# Patient Record
Sex: MALE | Race: WHITE | NOT HISPANIC OR LATINO | Employment: FULL TIME | URBAN - METROPOLITAN AREA
[De-identification: names, ages, dates, MRNs, and addresses within clinical notes are randomized per-mention and may not be internally consistent; named-entity substitution may affect disease eponyms.]

---

## 2023-10-02 ENCOUNTER — OFFICE VISIT (OUTPATIENT)
Dept: URGENT CARE | Facility: CLINIC | Age: 48
End: 2023-10-02
Payer: OTHER GOVERNMENT

## 2023-10-02 VITALS
HEART RATE: 66 BPM | SYSTOLIC BLOOD PRESSURE: 117 MMHG | BODY MASS INDEX: 23.34 KG/M2 | WEIGHT: 154 LBS | TEMPERATURE: 99 F | DIASTOLIC BLOOD PRESSURE: 72 MMHG | HEIGHT: 68 IN | OXYGEN SATURATION: 95 % | RESPIRATION RATE: 16 BRPM

## 2023-10-02 DIAGNOSIS — R09.82 POST-NASAL DRIP: ICD-10-CM

## 2023-10-02 DIAGNOSIS — R09.81 SINUS CONGESTION: Primary | ICD-10-CM

## 2023-10-02 PROCEDURE — 99213 OFFICE O/P EST LOW 20 MIN: CPT | Mod: 25,S$GLB,, | Performed by: NURSE PRACTITIONER

## 2023-10-02 PROCEDURE — 99213 PR OFFICE/OUTPT VISIT, EST, LEVL III, 20-29 MIN: ICD-10-PCS | Mod: 25,S$GLB,, | Performed by: NURSE PRACTITIONER

## 2023-10-02 PROCEDURE — 96372 THER/PROPH/DIAG INJ SC/IM: CPT | Mod: S$GLB,,, | Performed by: FAMILY MEDICINE

## 2023-10-02 PROCEDURE — 96372 PR INJECTION,THERAP/PROPH/DIAG2ST, IM OR SUBCUT: ICD-10-PCS | Mod: S$GLB,,, | Performed by: FAMILY MEDICINE

## 2023-10-02 RX ORDER — DEXAMETHASONE SODIUM PHOSPHATE 4 MG/ML
4 INJECTION, SOLUTION INTRA-ARTICULAR; INTRALESIONAL; INTRAMUSCULAR; INTRAVENOUS; SOFT TISSUE
Status: COMPLETED | OUTPATIENT
Start: 2023-10-02 | End: 2023-10-02

## 2023-10-02 RX ORDER — TIZANIDINE 2 MG/1
TABLET ORAL
COMMUNITY
Start: 2023-09-11 | End: 2024-01-10

## 2023-10-02 RX ORDER — ROSUVASTATIN CALCIUM 5 MG/1
5 TABLET, COATED ORAL NIGHTLY
COMMUNITY
Start: 2023-05-30 | End: 2024-01-10 | Stop reason: SDUPTHER

## 2023-10-02 RX ORDER — VALACYCLOVIR HYDROCHLORIDE 500 MG/1
500 TABLET, FILM COATED ORAL
COMMUNITY
Start: 2023-06-17 | End: 2023-12-14

## 2023-10-02 RX ORDER — LISINOPRIL 10 MG/1
10 TABLET ORAL
COMMUNITY
Start: 2023-07-13

## 2023-10-02 RX ADMIN — DEXAMETHASONE SODIUM PHOSPHATE 4 MG: 4 INJECTION, SOLUTION INTRA-ARTICULAR; INTRALESIONAL; INTRAMUSCULAR; INTRAVENOUS; SOFT TISSUE at 10:10

## 2023-10-02 NOTE — PROGRESS NOTES
"Subjective:      Patient ID: Eddie Brooks is a 48 y.o. male.    Vitals:  height is 5' 8" (1.727 m) and weight is 69.9 kg (154 lb). His oral temperature is 98.5 °F (36.9 °C). His blood pressure is 117/72 and his pulse is 66. His respiration is 16 and oxygen saturation is 95%.     Chief Complaint: Sore Throat    Sore throat, dry cough and fatigue that began yesterday.  Patient declines testing.  Patient request steroid injection.    Sore Throat   This is a new problem. The current episode started yesterday. Associated symptoms include coughing. Associated symptoms comments: fatigue.       HENT:  Positive for sore throat.    Respiratory:  Positive for cough.       Objective:     Physical Exam   HENT:   Head: Normocephalic and atraumatic.   Ears:   Right Ear: Tympanic membrane normal.   Left Ear: Tympanic membrane normal.   Nose: Nose normal.   Mouth/Throat: Mucous membranes are moist. Oropharynx is clear.      Comments: Post nasal drip noted  Neck: Neck supple.   Cardiovascular: Normal rate, regular rhythm, normal heart sounds and normal pulses.   Pulmonary/Chest: Effort normal and breath sounds normal.   Abdominal: Normal appearance.   Neurological: He is alert.     Assessment:     1. Sinus congestion    2. Post-nasal drip        Plan:       Sinus congestion    Post-nasal drip    Other orders  -     dexAMETHasone injection 4 mg                  Sinus congestion postnasal drip.  Postnasal drip leading to sore throat symptoms.  No evidence of strep.  Patient does not have any systemic symptoms at this time if those present he needs to do the COVID flu testing.  A discussion was made going over the risks and benefits of the steroid use and after this discussion they decided to proceed with injection. They understood the choice, were able to express a  choice, appreciated the risks associated with it, and they had logical reasoning for their choice and demonstrated capacity.     "

## 2023-12-21 ENCOUNTER — OFFICE VISIT (OUTPATIENT)
Dept: URGENT CARE | Facility: CLINIC | Age: 48
End: 2023-12-21
Payer: OTHER GOVERNMENT

## 2023-12-21 VITALS
OXYGEN SATURATION: 96 % | HEIGHT: 68 IN | TEMPERATURE: 102 F | WEIGHT: 160 LBS | DIASTOLIC BLOOD PRESSURE: 76 MMHG | BODY MASS INDEX: 24.25 KG/M2 | SYSTOLIC BLOOD PRESSURE: 129 MMHG | RESPIRATION RATE: 16 BRPM | HEART RATE: 107 BPM

## 2023-12-21 DIAGNOSIS — Z20.822 COVID-19 VIRUS NOT DETECTED: ICD-10-CM

## 2023-12-21 DIAGNOSIS — R50.9 FEVER, UNSPECIFIED FEVER CAUSE: ICD-10-CM

## 2023-12-21 DIAGNOSIS — J11.1 FLU: Primary | ICD-10-CM

## 2023-12-21 LAB
CTP QC/QA: YES
FLUAV AG NPH QL: NEGATIVE
FLUBV AG NPH QL: NEGATIVE
S PYO RRNA THROAT QL PROBE: NEGATIVE
SARS-COV-2 AG RESP QL IA.RAPID: NEGATIVE

## 2023-12-21 PROCEDURE — 87804 POCT INFLUENZA A/B: ICD-10-PCS | Mod: QW,,,

## 2023-12-21 PROCEDURE — 87811 SARS CORONAVIRUS 2 ANTIGEN POCT, MANUAL READ: ICD-10-PCS | Mod: QW,S$GLB,,

## 2023-12-21 PROCEDURE — 87880 POCT RAPID STREP A: ICD-10-PCS | Mod: QW,,,

## 2023-12-21 PROCEDURE — 99214 PR OFFICE/OUTPT VISIT, EST, LEVL IV, 30-39 MIN: ICD-10-PCS | Mod: S$GLB,,,

## 2023-12-21 PROCEDURE — 87804 INFLUENZA ASSAY W/OPTIC: CPT | Mod: QW,,,

## 2023-12-21 PROCEDURE — 87811 SARS-COV-2 COVID19 W/OPTIC: CPT | Mod: QW,S$GLB,,

## 2023-12-21 PROCEDURE — 87880 STREP A ASSAY W/OPTIC: CPT | Mod: QW,,,

## 2023-12-21 PROCEDURE — 99214 OFFICE O/P EST MOD 30 MIN: CPT | Mod: S$GLB,,,

## 2023-12-21 RX ORDER — ALBUTEROL SULFATE 90 UG/1
2 AEROSOL, METERED RESPIRATORY (INHALATION) EVERY 6 HOURS PRN
Qty: 6.7 G | Refills: 0 | Status: SHIPPED | OUTPATIENT
Start: 2023-12-21 | End: 2024-01-10

## 2023-12-21 RX ORDER — IBUPROFEN 600 MG/1
600 TABLET ORAL
Status: COMPLETED | OUTPATIENT
Start: 2023-12-21 | End: 2023-12-21

## 2023-12-21 RX ORDER — DEXTROMETHORPHAN HYDROBROMIDE AND GUAIFENESIN 10; 200 MG/1; MG/1
1 CAPSULE, GELATIN COATED ORAL EVERY 4 HOURS PRN
Qty: 30 EACH | Refills: 0 | Status: SHIPPED | OUTPATIENT
Start: 2023-12-21 | End: 2023-12-26

## 2023-12-21 RX ORDER — ONDANSETRON 4 MG/1
4 TABLET, ORALLY DISINTEGRATING ORAL EVERY 6 HOURS PRN
Qty: 20 TABLET | Refills: 0 | Status: SHIPPED | OUTPATIENT
Start: 2023-12-21 | End: 2024-01-10

## 2023-12-21 RX ORDER — OSELTAMIVIR PHOSPHATE 75 MG/1
75 CAPSULE ORAL 2 TIMES DAILY
Qty: 10 CAPSULE | Refills: 0 | Status: SHIPPED | OUTPATIENT
Start: 2023-12-21 | End: 2023-12-26

## 2023-12-21 RX ORDER — BENZONATATE 100 MG/1
100 CAPSULE ORAL 3 TIMES DAILY PRN
Qty: 30 CAPSULE | Refills: 0 | Status: SHIPPED | OUTPATIENT
Start: 2023-12-21 | End: 2023-12-31

## 2023-12-21 RX ADMIN — IBUPROFEN 600 MG: 600 TABLET ORAL at 10:12

## 2023-12-21 NOTE — PROGRESS NOTES
"Subjective:      Patient ID: Eddie Brooks is a 48 y.o. male.    Vitals:  height is 5' 8" (1.727 m) and weight is 72.6 kg (160 lb). His temperature is 102.1 °F (38.9 °C) (abnormal). His blood pressure is 129/76 and his pulse is 107. His respiration is 16 and oxygen saturation is 96%.     Chief Complaint: Fever    Mr. Morgan, past medical history of hypertension, presents to clinic with a chief complaint 102 fever T-max, headache, congestion, chest pressure, productive cough, and body aches that began this morning.  He is also reporting sore throat that he has had for several months.  The morning when he gets up brushes his teeth he will spit up scant amount of hemoptysis.  Is not persistent.  Patient does endorse regular alcohol use.  No history of varices.  He also states he feels like he has a weight on his chest and dyspnea.  States will not get a full breath due to pain on periphery.  No history of coronary artery disease, or diabetes.  He reports his high blood pressure is controlled.  No family history of early heart disease.  Pain is not reproducible with palpation, or movement.  Patient was referred to the emergency room for further evaluation of chest discomfort.    Spouse at home influenza positive as well, test negative in clinic. Although, febrile with recent exposure suspect influenza, and false-negative test.    Fever   This is a new problem. The current episode started today. The maximum temperature noted was 102 to 102.9 F. Associated symptoms include chest pain, congestion, coughing, headaches, muscle aches, nausea and a sore throat. Pertinent negatives include no rash. He has tried nothing for the symptoms.   Risk factors: sick contacts        Constitution: Positive for fever.   HENT:  Positive for congestion and sore throat.    Neck: Negative for neck pain.   Cardiovascular:  Positive for chest pain.   Eyes: Negative.    Respiratory:  Positive for chest tightness, cough and shortness of " breath.    Gastrointestinal:  Positive for nausea.   Genitourinary: Negative.    Musculoskeletal:  Positive for muscle ache.   Skin:  Negative for rash.   Allergic/Immunologic: Negative.  Positive for immunizations up-to-date. Negative for flu shot.   Neurological:  Positive for headaches.   Hematologic/Lymphatic: Negative.    Psychiatric/Behavioral: Negative.        Objective:     Physical Exam   Constitutional: He is oriented to person, place, and time. He appears well-developed. He is cooperative.  Non-toxic appearance. He does not appear ill. No distress. normal  HENT:   Head: Normocephalic and atraumatic.   Ears:   Right Ear: Hearing and external ear normal. impacted cerumen  Left Ear: Hearing and external ear normal. impacted cerumen  Nose: Nose normal. No mucosal edema or nasal deformity. No epistaxis. Right sinus exhibits no maxillary sinus tenderness and no frontal sinus tenderness. Left sinus exhibits no maxillary sinus tenderness and no frontal sinus tenderness.   Mouth/Throat: Uvula is midline and mucous membranes are normal. Mucous membranes are moist. No trismus in the jaw. Normal dentition. No uvula swelling. Posterior oropharyngeal erythema present. No oropharyngeal exudate. Oropharynx is clear.   Eyes: Conjunctivae and lids are normal. Pupils are equal, round, and reactive to light. Extraocular movement intact   Neck: Trachea normal and phonation normal. Neck supple.   Cardiovascular: Normal rate, regular rhythm, normal heart sounds and normal pulses.   Pulmonary/Chest: Effort normal and breath sounds normal. He exhibits no tenderness.   Abdominal: Normal appearance. Soft. flat abdomen   Musculoskeletal: Normal range of motion.         General: Normal range of motion.   Lymphadenopathy:     He has no cervical adenopathy.   Neurological: no focal deficit. He is alert, oriented to person, place, and time and at baseline. He exhibits normal muscle tone.   Skin: Skin is warm, dry, intact and not  diaphoretic. Capillary refill takes 2 to 3 seconds.   Psychiatric: His speech is normal and behavior is normal. Mood, judgment and thought content normal.   Nursing note and vitals reviewed.      Assessment:     1. Flu    2. Fever, unspecified fever cause    3. COVID-19 virus not detected        Plan:       Flu  -     oseltamivir (TAMIFLU) 75 MG capsule; Take 1 capsule (75 mg total) by mouth 2 (two) times daily. for 5 days  Dispense: 10 capsule; Refill: 0  -     albuterol (PROVENTIL HFA) 90 mcg/actuation inhaler; Inhale 2 puffs into the lungs every 6 (six) hours as needed for Wheezing. Rescue  Dispense: 6.7 g; Refill: 0  -     benzonatate (TESSALON) 100 MG capsule; Take 1 capsule (100 mg total) by mouth 3 (three) times daily as needed for Cough.  Dispense: 30 capsule; Refill: 0  -     dextromethorphan-guaiFENesin (CORICIDIN HBP CHEST YUE-COUGH)  mg Cap; Take 1 tablet by mouth every 4 (four) hours as needed (Cough and congestion).  Dispense: 30 each; Refill: 0  -     ondansetron (ZOFRAN-ODT) 4 MG TbDL; Take 1 tablet (4 mg total) by mouth every 6 (six) hours as needed (nausea).  Dispense: 20 tablet; Refill: 0    Fever, unspecified fever cause  -     SARS Coronavirus 2 Antigen, POCT Manual Read  -     POCT Influenza A/B Rapid Antigen  -     POCT rapid strep A  -     ibuprofen tablet 600 mg    COVID-19 virus not detected      Emergency room for further evaluation of dyspnea and chest pain

## 2023-12-21 NOTE — PATIENT INSTRUCTIONS
Begin Tamiflu soon as possible.  Medication to treat symptoms.  Emergency room further evaluation chest discomfort.

## 2024-01-10 PROBLEM — Z13.1 SCREENING FOR DIABETES MELLITUS (DM): Status: ACTIVE | Noted: 2024-01-10

## 2024-01-10 PROBLEM — I10 HYPERTENSION: Status: ACTIVE | Noted: 2024-01-10

## 2024-01-10 PROBLEM — Z13.1 SCREENING FOR DIABETES MELLITUS (DM): Status: RESOLVED | Noted: 2024-01-10 | Resolved: 2024-01-10

## 2024-01-10 PROBLEM — K27.9 PEPTIC ULCER: Status: ACTIVE | Noted: 2024-01-10

## 2024-01-10 PROBLEM — Z78.9 MODERATE ALCOHOL CONSUMPTION: Status: ACTIVE | Noted: 2024-01-10

## 2024-01-10 PROBLEM — D12.6 TUBULAR ADENOMA OF COLON: Chronic | Status: ACTIVE | Noted: 2023-05-30

## 2024-01-10 PROBLEM — K64.9 HEMORRHOIDS: Status: ACTIVE | Noted: 2024-01-10

## 2024-01-10 PROBLEM — C43.9 SKIN CANCER (MELANOMA): Status: ACTIVE | Noted: 2024-01-10

## 2024-01-10 PROBLEM — E78.00 PURE HYPERCHOLESTEROLEMIA: Status: ACTIVE | Noted: 2024-01-10

## 2024-04-10 PROBLEM — Z86.0101 HX OF ADENOMATOUS COLONIC POLYPS: Status: ACTIVE | Noted: 2024-04-10

## 2024-04-10 PROBLEM — Z87.442 HISTORY OF NEPHROLITHIASIS: Status: ACTIVE | Noted: 2024-04-10

## 2024-04-10 PROBLEM — Z86.010 HX OF ADENOMATOUS COLONIC POLYPS: Status: ACTIVE | Noted: 2024-04-10

## 2024-04-10 PROBLEM — K63.5 HYPERPLASTIC COLONIC POLYP: Status: ACTIVE | Noted: 2024-04-10

## 2024-04-10 PROBLEM — G56.03 BILATERAL CARPAL TUNNEL SYNDROME: Status: ACTIVE | Noted: 2024-04-10

## 2024-04-10 PROBLEM — Z87.891 STOPPED SMOKING WITH GREATER THAN 20 PACK YEAR HISTORY: Status: ACTIVE | Noted: 2024-04-10

## 2024-05-01 ENCOUNTER — OFFICE VISIT (OUTPATIENT)
Dept: ORTHOPEDICS | Facility: CLINIC | Age: 49
End: 2024-05-01
Payer: OTHER GOVERNMENT

## 2024-05-01 VITALS — WEIGHT: 161 LBS | HEIGHT: 68 IN | BODY MASS INDEX: 24.4 KG/M2

## 2024-05-01 DIAGNOSIS — G56.03 BILATERAL CARPAL TUNNEL SYNDROME: ICD-10-CM

## 2024-05-01 PROCEDURE — 99203 OFFICE O/P NEW LOW 30 MIN: CPT | Mod: S$PBB,,, | Performed by: ORTHOPAEDIC SURGERY

## 2024-05-01 PROCEDURE — 99213 OFFICE O/P EST LOW 20 MIN: CPT | Mod: PBBFAC,PO | Performed by: ORTHOPAEDIC SURGERY

## 2024-05-01 PROCEDURE — 99999 PR PBB SHADOW E&M-EST. PATIENT-LVL III: CPT | Mod: PBBFAC,,, | Performed by: ORTHOPAEDIC SURGERY

## 2024-05-01 NOTE — PROGRESS NOTES
2024    Chief Complaint:  Chief Complaint   Patient presents with    Right Hand - Pain    Left Hand - Pain       HPI:  Eddie Brooks is a 49 y.o. male, who presents to clinic today has a history of bilateral hand numbness and tingling.  This does affect throughout the day but more severely at night.  It was keeping him awake frequently.  He has tried wearing some splints at night which only give him temporary relief.  He was here today to discuss further treatment options.    PMHX:  Past Medical History:   Diagnosis Date    Hypercholesteremia     Hypertension     Malignant melanoma of skin, unspecified     Peptic ulcer disease     Sleep apnea, unspecified     TIA (transient ischemic attack)     pt states this was questionable diagnosis. Thinks it was more likely anxiety attack (shaking hands, ? facial dropping)    Tubular adenoma        PSHX:  Past Surgical History:   Procedure Laterality Date    DISTAL BICEPS TENDON REPAIR Left 2017    EXCISION OF MELANOMA Left     leg/calf    KNEE ARTHROSCOPY W/ MENISCECTOMY Right            FMHX:  Family History   Problem Relation Name Age of Onset    Kidney cancer Mother      Brain cancer Mother      Uterine cancer Mother      Alcohol abuse Father      Prostate cancer Neg Hx         SOCHX:  Social History     Tobacco Use    Smoking status: Former     Current packs/day: 0.00     Average packs/day: 1 pack/day for 24.0 years (24.0 ttl pk-yrs)     Types: Cigarettes     Start date:      Quit date:      Years since quittin.3    Smokeless tobacco: Never    Tobacco comments:     18- quite 8 years     1 PPD   Substance Use Topics    Alcohol use: Yes     Alcohol/week: 14.0 standard drinks of alcohol     Types: 14 Shots of liquor per week       ALLERGIES:  Neosporin [benzalkonium chloride] and Promethazine    CURRENT MEDICATIONS:  Current Outpatient Medications on File Prior to Visit   Medication Sig Dispense Refill    aspirin (ECOTRIN) 81 MG EC tablet Take 81 mg  "by mouth.      lisinopriL 10 MG tablet Take 1 tablet (10 mg total) by mouth once daily. 90 tablet 1    meloxicam (MOBIC) 15 MG tablet TAKE 1 TABLET BY MOUTH EVERY DAY 30 tablet 1    rosuvastatin (CRESTOR) 10 MG tablet Take 1 tablet (10 mg total) by mouth every evening. 90 tablet 1    scopolamine hydrobromide (SCOPOLAMINE HBR ORAL) Take 4 mg by mouth as needed (motion sickness).      tiZANidine (ZANAFLEX) 2 MG tablet Take 1.5 tablets (3 mg total) by mouth every evening. 135 tablet 1    valACYclovir (VALTREX) 500 MG tablet Take 500 mg by mouth.       No current facility-administered medications on file prior to visit.       REVIEW OF SYSTEMS:  Review of Systems   Constitutional: Negative.    HENT: Negative.     Eyes: Negative.    Respiratory: Negative.     Gastrointestinal: Negative.    Genitourinary: Negative.    Musculoskeletal:  Positive for joint pain.   Skin: Negative.    Neurological: Negative.    Endo/Heme/Allergies: Negative.    Psychiatric/Behavioral: Negative.       GENERAL PHYSICAL EXAM:   Ht 5' 8" (1.727 m)   Wt 73 kg (161 lb)   BMI 24.48 kg/m²    GEN: well developed, well nourished, no acute distress   HENT: Normocephalic, atraumatic   EYES: No discharge, conjunctiva normal   NECK: Supple, non-tender   PULM: No wheezing, no respiratory distress   CV: RRR   ABD: Soft, non-tender    ORTHO EXAM:   Examination of bilateral hands and wrist reveals that there is no edema.  There are no skin changes.  Palpation produces no tenderness.  He does have mild decreased sensation in the median distributions bilaterally.  Ulnar and radial sensation are intact.  Has positive Tinel's and positive Durkan's test bilaterally.  He is 2+ radial pulses    RADIOLOGY:   None    ASSESSMENT:   Bilateral carpal tunnel syndrome    PLAN:  1. I have discussed treatment going forward.  I have discussed the possibility of carpal tunnel release.  I would like to send him for an EMG nerve conduction study prior to considering surgical " intervention.    2. Will schedule him for an EMG nerve conduction study    3. Will follow up with me as soon as the nerve conduction study is completed for discussion of further treatment options

## 2024-05-22 ENCOUNTER — OFFICE VISIT (OUTPATIENT)
Dept: PHYSICAL MEDICINE AND REHAB | Facility: CLINIC | Age: 49
End: 2024-05-22
Payer: OTHER GOVERNMENT

## 2024-05-22 DIAGNOSIS — G56.03 BILATERAL CARPAL TUNNEL SYNDROME: ICD-10-CM

## 2024-05-22 PROCEDURE — 95886 MUSC TEST DONE W/N TEST COMP: CPT | Mod: 26,S$PBB,, | Performed by: PHYSICAL MEDICINE & REHABILITATION

## 2024-05-22 PROCEDURE — 95911 NRV CNDJ TEST 9-10 STUDIES: CPT | Mod: PBBFAC,PO | Performed by: PHYSICAL MEDICINE & REHABILITATION

## 2024-05-22 PROCEDURE — 99499 UNLISTED E&M SERVICE: CPT | Mod: S$PBB,,, | Performed by: PHYSICAL MEDICINE & REHABILITATION

## 2024-05-22 PROCEDURE — 95886 MUSC TEST DONE W/N TEST COMP: CPT | Mod: PBBFAC,PO | Performed by: PHYSICAL MEDICINE & REHABILITATION

## 2024-05-22 PROCEDURE — 99999 PR PBB SHADOW E&M-EST. PATIENT-LVL II: CPT | Mod: PBBFAC,,, | Performed by: PHYSICAL MEDICINE & REHABILITATION

## 2024-05-22 PROCEDURE — 99212 OFFICE O/P EST SF 10 MIN: CPT | Mod: PBBFAC,PO | Performed by: PHYSICAL MEDICINE & REHABILITATION

## 2024-05-22 PROCEDURE — 95911 NRV CNDJ TEST 9-10 STUDIES: CPT | Mod: 26,S$PBB,, | Performed by: PHYSICAL MEDICINE & REHABILITATION

## 2024-05-22 NOTE — PROGRESS NOTES
[facility]  [address] [place] [address]             Full Name: Eddie Brooks Gender: Male  MRN: [id number] YOB: 1975  History: Patient complaints of numbness, tingling and pain of bilateral hands.    Takes Aspirin 81mg daily.        Visit Date: 5/22/2024 9:17 AM  Age: 49 Years  Examining Physician: Trice Dang DO   Referring Physician: Abel Montilla MD   Height: 5 feet 8 inch      Sensory NCS      Nerve / Sites Rec. Site Onset Lat Peak Lat NP Amp PP Amp Segments Distance Velocity     ms ms µV µV  cm m/s   L Median - Digit III (Antidromic)      Wrist Dig III 4.71 6.31 8.9 12.4 Wrist - Dig III 14 30   R Median - Digit III (Antidromic)      Wrist Dig III 4.50 6.38 8.9 11.6 Wrist - Dig III 14 31   L Ulnar - Digit V (Antidromic)      Wrist Dig V 2.35 3.44 13.1 28.8 Wrist - Dig V 14 59   R Ulnar - Digit V (Antidromic)      Wrist Dig V 2.60 3.46 13.7 19.6 Wrist - Dig V 14 54   L Radial - Anatomical snuff box (Forearm)      Forearm Wrist 1.75 2.48 22.4 12.0 Forearm - Wrist 10 57   R Radial - Anatomical snuff box (Forearm)      Forearm Wrist 1.56 2.27 25.0 12.3 Forearm - Wrist 10 64       Motor NCS      Nerve / Sites Muscle Latency Amplitude Amp % Duration Segments Distance Lat Diff Velocity     ms mV % ms  cm ms m/s   L Median - APB      Wrist APB 5.65 5.3 100 6.71 Wrist - APB 8        Elbow APB 10.25 4.6 86.3 6.67 Elbow - Wrist 22 4.60 48   R Median - APB      Wrist APB 5.90 8.2 100 7.04 Wrist - APB 8        Elbow APB 10.71 7.8 95.6 6.79 Elbow - Wrist 22 4.81 46   L Ulnar - ADM      Wrist ADM 3.50 9.4 100 6.35 Wrist - ADM 8        B.Elbow ADM 6.42 9.4 99.3 6.06 B.Elbow - Wrist 17 2.92 58      A.Elbow ADM 8.35 8.6 91.6 6.19 A.Elbow - B.Elbow 10 1.94 52   R Ulnar - ADM      Wrist ADM 3.31 12.1 100 6.15 Wrist - ADM 8        B.Elbow ADM 6.46 11.3 93.5 6.52 B.Elbow - Wrist 20 3.15 64      A.Elbow ADM 8.33 11.0 91.2 6.13 A.Elbow - B.Elbow 11 1.88 59       EMG Summary Table      Spontaneous MUAP Recruitment   Muscle IA Fib PSW Fasc CRD Amp Dur. Poly Pattern   L. Deltoid N None None None None N N None N   L. Biceps brachii N None None None None N N None N   L. Triceps brachii N None None None None N N None N   L. Pronator teres N None None None None N N None N   L. Abductor pollicis brevis N None None None None N N None N   R. Deltoid N None None None None N N None N   R. Biceps brachii N None None None None N N None N   R. Triceps brachii N None None None None N N None N   R. Pronator teres N None None None None N N None N   R. Abductor pollicis brevis N None None None None N N None N       Summary    The motor conduction test was performed on 4 nerve(s). The results were normal in 2 nerve(s): L Ulnar - ADM, R Ulnar - ADM. Results outside the specified normal range were found in 2 nerve(s), as follows:  In the L Median - APB study  the take off latency result was increased for Wrist stimulation  the take off velocity result was reduced for Elbow - Wrist segment  In the R Median - APB study  the take off latency result was increased for Wrist stimulation  the take off velocity result was reduced for Elbow - Wrist segment    The sensory conduction test was performed on 6 nerve(s). The results were normal in 4 nerve(s): L Ulnar - Digit V (Antidromic), R Ulnar - Digit V (Antidromic), L Radial - Anatomical snuff box (Forearm), R Radial - Anatomical snuff box (Forearm). Results outside the specified normal range were found in 2 nerve(s), as follows:  In the L Median - Digit III (Antidromic) study  the peak latency result was increased for Wrist stimulation  the peak amplitude result was reduced for Wrist stimulation  In the R Median - Digit III (Antidromic) study  the peak latency result was increased for Wrist stimulation  the peak amplitude result was reduced for Wrist stimulation    The needle EMG study was normal in all 10 tested muscles: L. Deltoid, L. Biceps brachii, L. Triceps brachii, L.  Pronator teres, L. Abductor pollicis brevis, R. Deltoid, R. Biceps brachii, R. Triceps brachii, R. Pronator teres, R. Abductor pollicis brevis.       Impression:  Abnormal study.    Moderate bilateral carpal tunnel syndrome, without active denervation.    No electrophysiologic evidence of bilateral cervical radiculopathy/plexopathy, bilateral ulnar mononeuropathy, or peripheral neuropathy in bilateral upper extremities.    ------------------------------  Trice Dang, DO

## 2024-05-29 ENCOUNTER — OFFICE VISIT (OUTPATIENT)
Dept: ORTHOPEDICS | Facility: CLINIC | Age: 49
End: 2024-05-29
Payer: OTHER GOVERNMENT

## 2024-05-29 VITALS — WEIGHT: 160.94 LBS | BODY MASS INDEX: 24.39 KG/M2 | HEIGHT: 68 IN

## 2024-05-29 DIAGNOSIS — G56.03 CARPAL TUNNEL SYNDROME, BILATERAL: Primary | ICD-10-CM

## 2024-05-29 PROCEDURE — 99213 OFFICE O/P EST LOW 20 MIN: CPT | Mod: PBBFAC,PO | Performed by: ORTHOPAEDIC SURGERY

## 2024-05-29 PROCEDURE — 20526 THER INJECTION CARP TUNNEL: CPT | Mod: PBBFAC,50,PO | Performed by: ORTHOPAEDIC SURGERY

## 2024-05-29 PROCEDURE — 99999PBSHW PR PBB SHADOW TECHNICAL ONLY FILED TO HB: Mod: PBBFAC,,,

## 2024-05-29 PROCEDURE — 99213 OFFICE O/P EST LOW 20 MIN: CPT | Mod: S$PBB,25,, | Performed by: ORTHOPAEDIC SURGERY

## 2024-05-29 PROCEDURE — 99999 PR PBB SHADOW E&M-EST. PATIENT-LVL III: CPT | Mod: PBBFAC,,, | Performed by: ORTHOPAEDIC SURGERY

## 2024-05-29 RX ORDER — TRIAMCINOLONE ACETONIDE 40 MG/ML
40 INJECTION, SUSPENSION INTRA-ARTICULAR; INTRAMUSCULAR
Status: DISCONTINUED | OUTPATIENT
Start: 2024-05-29 | End: 2024-05-29 | Stop reason: HOSPADM

## 2024-05-29 RX ADMIN — TRIAMCINOLONE ACETONIDE 40 MG: 40 INJECTION, SUSPENSION INTRA-ARTICULAR; INTRAMUSCULAR at 08:05

## 2024-05-29 NOTE — PROCEDURES
Carpal Tunnel    Date/Time: 5/29/2024 8:00 AM    Performed by: Abel Montilla MD  Authorized by: Abel Montilla MD    Consent Done?:  Yes (Verbal)  Indications:  Pain  Site marked: the procedure site was marked    Timeout: prior to procedure the correct patient, procedure, and site was verified    Prep: patient was prepped and draped in usual sterile fashion      Local anesthesia used?: Yes    Local anesthetic:  Lidocaine 1% without epinephrine  Anesthetic total (ml):  0.5    Location:  Wrist (Left carpal tunnel)  Site:  L carpal tunnel  Needle size:  25 G  Medications:  40 mg triamcinolone acetonide 40 mg/mL (20 mg injected)  Patient tolerance:  Patient tolerated the procedure well with no immediate complications

## 2024-05-29 NOTE — PROGRESS NOTES
Mr Brooks returns to clinic today.  Has a history of bilateral carpal tunnel syndrome.  He was sent for nerve conduction study.  He was here today for follow-up.  He was still having severe symptoms which do frequently keep him up at night     Physical exam:  Examination of bilateral hands reveals that there is no edema there is no skin change.  Palpation produces no tenderness.  He has have some decreased sensation in the median distributions when compared to the ulnar and radial distributions.  He has negative Tinel's but positive Durkan's test.  He is 2+ radial pulse     Examination of the left elbow reveals that there is no edema.  Range of motion is 0-150 degrees.  There is no instability.  He was tenderness directly over the lateral epicondyle and common extensor origin.    EMG nerve conduction study:  Nerve conduction has been reviewed.  It was consistent with moderate bilateral carpal tunnel syndrome     Assessment: Bilateral carpal tunnel syndrome , left elbow lateral epicondylitis    Plan:     1. After consent was obtained injection was placed to the right and left carpal tunnels.  He tolerated that well     2. He will follow up with me in 2 months.  At that time if he was having any return of his symptoms we will discuss the possibility of scheduling him for carpal tunnel release    3.  Will provide him with a tennis elbow strap for his left elbow that he will wear full-time.  He was just started taking meloxicam as well.

## 2024-05-29 NOTE — PROCEDURES
Carpal Tunnel    Date/Time: 5/29/2024 8:00 AM    Performed by: Abel Montilla MD  Authorized by: Abel Montilla MD    Consent Done?:  Yes (Verbal)  Indications:  Pain  Site marked: the procedure site was marked    Timeout: prior to procedure the correct patient, procedure, and site was verified    Prep: patient was prepped and draped in usual sterile fashion      Local anesthesia used?: Yes    Local anesthetic:  Lidocaine 1% without epinephrine  Anesthetic total (ml):  0.5    Location:  Wrist (Right carpal tunnel)  Site:  R carpal tunnel  Needle size:  25 G  Medications:  40 mg triamcinolone acetonide 40 mg/mL (20 mg injected)  Patient tolerance:  Patient tolerated the procedure well with no immediate complications

## 2024-05-30 ENCOUNTER — OFFICE VISIT (OUTPATIENT)
Dept: UROLOGY | Facility: CLINIC | Age: 49
End: 2024-05-30
Payer: OTHER GOVERNMENT

## 2024-05-30 VITALS — HEIGHT: 68 IN | WEIGHT: 165.13 LBS | BODY MASS INDEX: 25.03 KG/M2

## 2024-05-30 DIAGNOSIS — N20.0 KIDNEY STONES: Primary | ICD-10-CM

## 2024-05-30 DIAGNOSIS — N28.1 HYPERDENSE RENAL CYST: ICD-10-CM

## 2024-05-30 PROCEDURE — 99204 OFFICE O/P NEW MOD 45 MIN: CPT | Mod: S$PBB,,, | Performed by: STUDENT IN AN ORGANIZED HEALTH CARE EDUCATION/TRAINING PROGRAM

## 2024-05-30 PROCEDURE — 99999 PR PBB SHADOW E&M-EST. PATIENT-LVL IV: CPT | Mod: PBBFAC,,, | Performed by: STUDENT IN AN ORGANIZED HEALTH CARE EDUCATION/TRAINING PROGRAM

## 2024-05-30 PROCEDURE — 99214 OFFICE O/P EST MOD 30 MIN: CPT | Mod: PBBFAC,PO | Performed by: STUDENT IN AN ORGANIZED HEALTH CARE EDUCATION/TRAINING PROGRAM

## 2024-05-30 NOTE — PROGRESS NOTES
"[facility]   Clinic Note    Subjective:     Chief Complaint: Nephrolithiasis      History of Present Illness:  Eddie Brooks is a 49 y.o. male who presents to clinic for evaluation and management of kidney stones and a renal cyst. He is new to our clinic referred by Dr. Dior Mishra    He has had intermittent left flank pain and underwent a CT 4/2024. Imaging independently reviewed and interpreted showing a left mid pole renal stone measuring 1 cm with adjacent 5 mm stone.  There was no hydronephrosis. SSD 7 cm,  Hounsfield units 1500.   1.7 cm hyperdense right renal cyst.  2 small nonobstructing stones in the right lower pole.  He reports a history of passing stones years ago but none recent.    He reports a family history of kidney cancer and kidney stones    Past medical, family, surgical and social history reviewed as documented in chart with pertinent positive medical, family, surgical and social history detailed in HPI.    A review systems was conducted with pertinent positive and negative findings documented in HPI.    Objective:     Estimated body mass index is 25.11 kg/m² as calculated from the following:    Height as of this encounter: 5' 8" (1.727 m).    Weight as of this encounter: 74.9 kg (165 lb 2 oz).    Vital Signs (Most Recent)       Physical Exam  Constitutional:       General: He is not in acute distress.     Appearance: He is not ill-appearing or toxic-appearing.   Pulmonary:      Effort: Pulmonary effort is normal. No accessory muscle usage or respiratory distress.   Neurological:      Mental Status: He is alert.         Labs reviewed below:  Lab Results   Component Value Date    BUN 21 01/10/2024    CREATININE 1.11 01/10/2024    WBC 4.21 01/10/2024    HGB 15.2 01/10/2024    HCT 47.4 01/10/2024     01/10/2024    AST 34 01/10/2024    ALT 37 01/10/2024    ALKPHOS 50 01/10/2024    ALBUMIN 4.8 01/10/2024    HGBA1C 5.4 01/10/2024        PSA trend reviewed below:  Lab Results "   Component Value Date    PSA 1.0 05/01/2024      Assessment:     1. Kidney stones    2. Hyperdense renal cyst      Plan:     We discussed the medical and surgical management of stones and different approaches depending on the stone size and location. We reviewed shockwave lithotripsy, ureteroscopy with laser lithotripsy, or observation. We discussed the risks and benefits to each approach. We discussed expectations and recovery times, and stone free rates. We reviewed the possibility of needing a ureteral stent and its associated risks. We also discussed the possible need for further procedures regardless of approach.     Plan observation for now  KUB and renal US in 6 months    Bony Luong MD

## 2024-08-04 ENCOUNTER — HOSPITAL ENCOUNTER (EMERGENCY)
Facility: HOSPITAL | Age: 49
Discharge: HOME | End: 2024-08-04
Attending: STUDENT IN AN ORGANIZED HEALTH CARE EDUCATION/TRAINING PROGRAM
Payer: OTHER GOVERNMENT

## 2024-08-04 VITALS
SYSTOLIC BLOOD PRESSURE: 154 MMHG | DIASTOLIC BLOOD PRESSURE: 78 MMHG | WEIGHT: 153 LBS | BODY MASS INDEX: 23.19 KG/M2 | HEART RATE: 61 BPM | OXYGEN SATURATION: 99 % | HEIGHT: 68 IN | TEMPERATURE: 99 F | RESPIRATION RATE: 18 BRPM

## 2024-08-04 DIAGNOSIS — S22.32XB OPEN FRACTURE OF ONE RIB OF LEFT SIDE, INITIAL ENCOUNTER: Primary | ICD-10-CM

## 2024-08-04 DIAGNOSIS — S35.329A: ICD-10-CM

## 2024-08-04 LAB
ALBUMIN SERPL BCP-MCNC: 4.3 G/DL (ref 3.5–5.2)
ALP SERPL-CCNC: 50 U/L (ref 55–135)
ALT SERPL W/O P-5'-P-CCNC: 34 U/L (ref 10–44)
ANION GAP SERPL CALC-SCNC: 6 MMOL/L (ref 8–16)
AST SERPL-CCNC: 19 U/L (ref 10–40)
BASOPHILS # BLD AUTO: 0.02 K/UL (ref 0–0.2)
BASOPHILS NFR BLD: 0.3 % (ref 0–1.9)
BILIRUB SERPL-MCNC: 1.4 MG/DL (ref 0.1–1)
BILIRUB UR QL STRIP: NEGATIVE
BUN SERPL-MCNC: 20 MG/DL (ref 6–20)
CALCIUM SERPL-MCNC: 9.3 MG/DL (ref 8.7–10.5)
CHLORIDE SERPL-SCNC: 105 MMOL/L (ref 95–110)
CLARITY UR: CLEAR
CO2 SERPL-SCNC: 30 MMOL/L (ref 23–29)
COLOR UR: YELLOW
CREAT SERPL-MCNC: 0.9 MG/DL (ref 0.5–1.4)
CREAT SERPL-MCNC: 1 MG/DL (ref 0.5–1.4)
DIFFERENTIAL METHOD BLD: ABNORMAL
EOSINOPHIL # BLD AUTO: 0.1 K/UL (ref 0–0.5)
EOSINOPHIL NFR BLD: 1.3 % (ref 0–8)
ERYTHROCYTE [DISTWIDTH] IN BLOOD BY AUTOMATED COUNT: 14.2 % (ref 11.5–14.5)
EST. GFR  (NO RACE VARIABLE): >60 ML/MIN/1.73 M^2
GLUCOSE SERPL-MCNC: 98 MG/DL (ref 70–110)
GLUCOSE UR QL STRIP: NEGATIVE
HCT VFR BLD AUTO: 44.8 % (ref 40–54)
HGB BLD-MCNC: 14.5 G/DL (ref 14–18)
HGB UR QL STRIP: NEGATIVE
IMM GRANULOCYTES # BLD AUTO: 0.05 K/UL (ref 0–0.04)
IMM GRANULOCYTES NFR BLD AUTO: 0.7 % (ref 0–0.5)
KETONES UR QL STRIP: NEGATIVE
LEUKOCYTE ESTERASE UR QL STRIP: NEGATIVE
LIPASE SERPL-CCNC: 35 U/L (ref 4–60)
LYMPHOCYTES # BLD AUTO: 1.5 K/UL (ref 1–4.8)
LYMPHOCYTES NFR BLD: 21.4 % (ref 18–48)
MCH RBC QN AUTO: 27 PG (ref 27–31)
MCHC RBC AUTO-ENTMCNC: 32.4 G/DL (ref 32–36)
MCV RBC AUTO: 83 FL (ref 82–98)
MONOCYTES # BLD AUTO: 0.9 K/UL (ref 0.3–1)
MONOCYTES NFR BLD: 12.8 % (ref 4–15)
NEUTROPHILS # BLD AUTO: 4.3 K/UL (ref 1.8–7.7)
NEUTROPHILS NFR BLD: 63.5 % (ref 38–73)
NITRITE UR QL STRIP: NEGATIVE
NRBC BLD-RTO: 0 /100 WBC
PH UR STRIP: 7 [PH] (ref 5–8)
PLATELET # BLD AUTO: 228 K/UL (ref 150–450)
PMV BLD AUTO: 9.9 FL (ref 9.2–12.9)
POTASSIUM SERPL-SCNC: 4.2 MMOL/L (ref 3.5–5.1)
PROT SERPL-MCNC: 6.7 G/DL (ref 6–8.4)
PROT UR QL STRIP: NEGATIVE
RBC # BLD AUTO: 5.37 M/UL (ref 4.6–6.2)
SAMPLE: NORMAL
SODIUM SERPL-SCNC: 141 MMOL/L (ref 136–145)
SP GR UR STRIP: 1.01 (ref 1–1.03)
URN SPEC COLLECT METH UR: NORMAL
UROBILINOGEN UR STRIP-ACNC: NEGATIVE EU/DL
WBC # BLD AUTO: 6.79 K/UL (ref 3.9–12.7)

## 2024-08-04 PROCEDURE — 25500020 PHARM REV CODE 255: Performed by: NURSE PRACTITIONER

## 2024-08-04 PROCEDURE — 85025 COMPLETE CBC W/AUTO DIFF WBC: CPT | Performed by: NURSE PRACTITIONER

## 2024-08-04 PROCEDURE — 80053 COMPREHEN METABOLIC PANEL: CPT | Performed by: NURSE PRACTITIONER

## 2024-08-04 PROCEDURE — 82565 ASSAY OF CREATININE: CPT

## 2024-08-04 PROCEDURE — 81003 URINALYSIS AUTO W/O SCOPE: CPT | Performed by: NURSE PRACTITIONER

## 2024-08-04 PROCEDURE — 99285 EMERGENCY DEPT VISIT HI MDM: CPT | Mod: 25

## 2024-08-04 PROCEDURE — 83690 ASSAY OF LIPASE: CPT | Performed by: NURSE PRACTITIONER

## 2024-08-04 RX ORDER — OXYCODONE AND ACETAMINOPHEN 7.5; 325 MG/1; MG/1
1 TABLET ORAL EVERY 4 HOURS PRN
Qty: 12 TABLET | Refills: 0 | Status: SHIPPED | OUTPATIENT
Start: 2024-08-04

## 2024-08-04 RX ADMIN — IOHEXOL 100 ML: 350 INJECTION, SOLUTION INTRAVENOUS at 03:08

## 2024-08-05 PROBLEM — S22.32XA CLOSED FRACTURE OF ONE RIB OF LEFT SIDE: Status: ACTIVE | Noted: 2024-08-04

## 2024-09-24 ENCOUNTER — OFFICE VISIT (OUTPATIENT)
Dept: ORTHOPEDICS | Facility: CLINIC | Age: 49
End: 2024-09-24
Payer: OTHER GOVERNMENT

## 2024-09-24 VITALS — BODY MASS INDEX: 23.56 KG/M2 | HEIGHT: 68 IN | WEIGHT: 155.44 LBS

## 2024-09-24 DIAGNOSIS — G56.01 CARPAL TUNNEL SYNDROME OF RIGHT WRIST: Primary | ICD-10-CM

## 2024-09-24 DIAGNOSIS — G56.02 CARPAL TUNNEL SYNDROME OF LEFT WRIST: ICD-10-CM

## 2024-09-24 PROCEDURE — 99999PBSHW PR PBB SHADOW TECHNICAL ONLY FILED TO HB: Mod: PBBFAC,,,

## 2024-09-24 PROCEDURE — 99213 OFFICE O/P EST LOW 20 MIN: CPT | Mod: S$PBB,25,, | Performed by: PHYSICIAN ASSISTANT

## 2024-09-24 PROCEDURE — 99999 PR PBB SHADOW E&M-EST. PATIENT-LVL III: CPT | Mod: PBBFAC,,, | Performed by: PHYSICIAN ASSISTANT

## 2024-09-24 PROCEDURE — 20526 THER INJECTION CARP TUNNEL: CPT | Mod: PBBFAC,PO,LT | Performed by: PHYSICIAN ASSISTANT

## 2024-09-24 PROCEDURE — 20526 THER INJECTION CARP TUNNEL: CPT | Mod: S$PBB,50,, | Performed by: PHYSICIAN ASSISTANT

## 2024-09-24 PROCEDURE — 99213 OFFICE O/P EST LOW 20 MIN: CPT | Mod: PBBFAC,PO,25 | Performed by: PHYSICIAN ASSISTANT

## 2024-09-24 RX ORDER — TRIAMCINOLONE ACETONIDE 40 MG/ML
40 INJECTION, SUSPENSION INTRA-ARTICULAR; INTRAMUSCULAR
Status: DISCONTINUED | OUTPATIENT
Start: 2024-09-24 | End: 2024-09-24 | Stop reason: HOSPADM

## 2024-09-24 RX ADMIN — TRIAMCINOLONE ACETONIDE 40 MG: 40 INJECTION, SUSPENSION INTRA-ARTICULAR; INTRAMUSCULAR at 10:09

## 2024-09-24 NOTE — PROGRESS NOTES
2024    HPI:  Eddie Brooks is a 49 y.o. male, who presents to clinic today for continued evaluation of his bilateral carpal tunnel syndrome.  States the injection she received approximately 4 months ago completely resolved his symptoms until recently.  States symptoms are currently very mild, and he was not in a position to have surgery at this time.  Denies any acute injuries since his last visit.  Denies any other complaints this time.    PMHX:  Past Medical History:   Diagnosis Date    Hypercholesteremia     Hypertension     Malignant melanoma of skin, unspecified     Peptic ulcer disease     Sleep apnea, unspecified     TIA (transient ischemic attack)     pt states this was questionable diagnosis. Thinks it was more likely anxiety attack (shaking hands, ? facial dropping)    Tubular adenoma        PSHX:  Past Surgical History:   Procedure Laterality Date    COLONOSCOPY W/ POLYPECTOMY  2022    DISTAL BICEPS TENDON REPAIR Left 2017    EXCISION OF MELANOMA Left     leg/calf    KNEE ARTHROSCOPY W/ MENISCECTOMY Right     2020       FMHX:  Family History   Problem Relation Name Age of Onset    Kidney cancer Mother      Brain cancer Mother      Uterine cancer Mother      Alcohol abuse Father      Prostate cancer Neg Hx         SOCHX:  Social History     Tobacco Use    Smoking status: Former     Current packs/day: 0.00     Average packs/day: 1 pack/day for 24.0 years (24.0 ttl pk-yrs)     Types: Cigarettes     Start date:      Quit date: 2015     Years since quittin.7    Smokeless tobacco: Never    Tobacco comments:     18- quite 8 years     1 PPD   Substance Use Topics    Alcohol use: Yes     Alcohol/week: 14.0 standard drinks of alcohol     Types: 14 Shots of liquor per week       ALLERGIES:  Neosporin [benzalkonium chloride] and Promethazine    CURRENT MEDICATIONS:  Current Outpatient Medications on File Prior to Visit   Medication Sig Dispense Refill    aspirin (ECOTRIN) 81 MG EC tablet Take  "81 mg by mouth.      lisinopriL 10 MG tablet Take 1 tablet (10 mg total) by mouth once daily. 90 tablet 0    meloxicam (MOBIC) 15 MG tablet TAKE 1 TABLET BY MOUTH EVERY DAY 90 tablet 1    rosuvastatin (CRESTOR) 10 MG tablet Take 1 tablet (10 mg total) by mouth every evening. 90 tablet 0    scopolamine hydrobromide (SCOPOLAMINE HBR ORAL) Take 4 mg by mouth as needed (motion sickness).      tiZANidine (ZANAFLEX) 2 MG tablet Take 1.5 tablets (3 mg total) by mouth every evening. 135 tablet 0    oxyCODONE-acetaminophen (PERCOCET) 7.5-325 mg per tablet Take 1 tablet by mouth every 4 (four) hours as needed. (Patient not taking: Reported on 9/24/2024) 12 tablet 0    valACYclovir (VALTREX) 500 MG tablet Take 500 mg by mouth.       No current facility-administered medications on file prior to visit.       REVIEW OF SYSTEMS:  Review of Systems Complete; Negative, unless noted above.    GENERAL PHYSICAL EXAM:   Ht 5' 8" (1.727 m)   Wt 70.5 kg (155 lb 6.8 oz)   BMI 23.63 kg/m²    GEN: well developed, well nourished, no acute distress   PULM: No wheezing, no respiratory distress   CV: RRR    ORTHO EXAM:   Examination of the bilateral hands reveals no edema, erythema, ecchymosis, or skin breakdown.  Positive carpal Tinel's test bilaterally.  5/5 /intrinsic strength.  5/5 thenar strength.  Normal sensation in the radial, ulnar, and median nerve distributions.  Capillary refill less than 2 seconds.    RADIOLOGY:   None.    ASSESSMENT:   Bilateral carpal tunnel syndrome    PLAN:  1. I discussed with Eddie Brooks that considering he is not a good position has surgical this time, that we would perform a repeat set of bilateral carpal tunnel injections in clinic today.  He verbally agreed with the treatment plan     2. Informed consent was obtained.  After an alcohol prep followed by chlorhexidine prep, a steroid injection was placed into the right carpal tunnel.  After an alcohol prep followed by chlorhexidine prep, a steroid " injection was placed into the left carpal tunnel.  Tolerated both procedures well with no immediate complications.      3. I would like him follow-up in clinic in 3 months with Dr. Montilla to discuss surgical intervention.  He was instructed to contact clinic for any problems or concerns in the interim.

## 2024-09-24 NOTE — PROCEDURES
Carpal Tunnel    Date/Time: 9/24/2024 10:20 AM    Performed by: Rodney Hurtado PA-C  Authorized by: Rodney Hurtado PA-C    Consent Done?:  Yes (Verbal)  Indications:  Pain  Site marked: the procedure site was marked    Timeout: prior to procedure the correct patient, procedure, and site was verified    Prep: patient was prepped and draped in usual sterile fashion      Local anesthesia used?: Yes    Local anesthetic:  Lidocaine 1% without epinephrine  Anesthetic total (ml):  0.5    Location:  Wrist  Site:  L carpal tunnel  Ultrasonic Guidance for Needle Placement?: No    Needle size:  25 G  Approach:  Volar  Medications:  40 mg triamcinolone acetonide 40 mg/mL (20 mg injected)  Patient tolerance:  Patient tolerated the procedure well with no immediate complications     Labile

## 2024-09-24 NOTE — PROCEDURES
Carpal Tunnel    Date/Time: 9/24/2024 10:20 AM    Performed by: Rodney Hurtado PA-C  Authorized by: Rodney Hurtado PA-C    Consent Done?:  Yes (Verbal)  Indications:  Pain  Site marked: the procedure site was marked    Timeout: prior to procedure the correct patient, procedure, and site was verified    Prep: patient was prepped and draped in usual sterile fashion      Local anesthesia used?: Yes    Local anesthetic:  Lidocaine 1% without epinephrine  Anesthetic total (ml):  0.5    Location:  Wrist  Site:  R carpal tunnel  Ultrasonic Guidance for Needle Placement?: No    Needle size:  25 G  Approach:  Volar  Medications:  40 mg triamcinolone acetonide 40 mg/mL (20 mg injected)  Patient tolerance:  Patient tolerated the procedure well with no immediate complications

## 2024-10-10 PROBLEM — F41.9 MILD ANXIETY: Status: ACTIVE | Noted: 2024-10-10

## 2024-10-10 PROBLEM — F90.0 ATTENTION DEFICIT HYPERACTIVITY DISORDER (ADHD), PREDOMINANTLY INATTENTIVE TYPE: Status: ACTIVE | Noted: 2024-10-10

## 2024-12-09 ENCOUNTER — HOSPITAL ENCOUNTER (OUTPATIENT)
Dept: RADIOLOGY | Facility: HOSPITAL | Age: 49
Discharge: HOME | End: 2024-12-09
Attending: STUDENT IN AN ORGANIZED HEALTH CARE EDUCATION/TRAINING PROGRAM
Payer: OTHER GOVERNMENT

## 2024-12-09 DIAGNOSIS — N20.0 KIDNEY STONES: ICD-10-CM

## 2024-12-09 DIAGNOSIS — N28.1 HYPERDENSE RENAL CYST: ICD-10-CM

## 2024-12-09 PROCEDURE — 74018 RADEX ABDOMEN 1 VIEW: CPT | Mod: 26,,, | Performed by: STUDENT IN AN ORGANIZED HEALTH CARE EDUCATION/TRAINING PROGRAM

## 2024-12-09 PROCEDURE — 76770 US EXAM ABDO BACK WALL COMP: CPT | Mod: 26,,, | Performed by: STUDENT IN AN ORGANIZED HEALTH CARE EDUCATION/TRAINING PROGRAM

## 2024-12-09 PROCEDURE — 76770 US EXAM ABDO BACK WALL COMP: CPT | Mod: TC,PO

## 2024-12-09 PROCEDURE — 74018 RADEX ABDOMEN 1 VIEW: CPT | Mod: TC,FY,PO

## 2025-01-06 ENCOUNTER — OFFICE VISIT (OUTPATIENT)
Dept: ORTHOPEDICS | Facility: CLINIC | Age: 50
End: 2025-01-06

## 2025-01-06 VITALS
WEIGHT: 162.69 LBS | BODY MASS INDEX: 24.66 KG/M2 | DIASTOLIC BLOOD PRESSURE: 68 MMHG | SYSTOLIC BLOOD PRESSURE: 126 MMHG | HEIGHT: 68 IN | HEART RATE: 60 BPM

## 2025-01-06 DIAGNOSIS — G56.03 CARPAL TUNNEL SYNDROME, BILATERAL: Primary | ICD-10-CM

## 2025-01-06 PROCEDURE — 99213 OFFICE O/P EST LOW 20 MIN: CPT | Mod: PBBFAC,PO | Performed by: ORTHOPAEDIC SURGERY

## 2025-01-06 PROCEDURE — 99213 OFFICE O/P EST LOW 20 MIN: CPT | Mod: S$PBB,,, | Performed by: ORTHOPAEDIC SURGERY

## 2025-01-06 PROCEDURE — 99999 PR PBB SHADOW E&M-EST. PATIENT-LVL III: CPT | Mod: PBBFAC,,, | Performed by: ORTHOPAEDIC SURGERY

## 2025-01-06 NOTE — H&P (VIEW-ONLY)
1/6/2025    Chief Complaint:  Chief Complaint   Patient presents with    Right Hand - Pain    Left Hand - Pain       HPI:  Eddie Brooks is a 49 y.o. male, who presents to clinic today has a history of bilateral carpal tunnel syndrome.  We have attempted to treat this conservatively.  He has tried steroid injections.  He has had response to the injections but he continues to have recurrence of his symptoms.  He is here today to discuss further treatment    PMHX:  Past Medical History:   Diagnosis Date    Hypercholesteremia     Hypertension     Malignant melanoma of skin, unspecified     Peptic ulcer disease     Sleep apnea, unspecified     TIA (transient ischemic attack)     pt states this was questionable diagnosis. Thinks it was more likely anxiety attack (shaking hands, ? facial dropping)    Tubular adenoma        PSHX:  Past Surgical History:   Procedure Laterality Date    COLONOSCOPY W/ POLYPECTOMY  09/27/2022    DISTAL BICEPS TENDON REPAIR Left 2017    EXCISION OF MELANOMA Left     leg/calf    KNEE ARTHROSCOPY W/ MENISCECTOMY Right     2020       FMHX:  Family History   Problem Relation Name Age of Onset    Kidney cancer Mother      Brain cancer Mother      Uterine cancer Mother      Alcohol abuse Father      Prostate cancer Neg Hx         SOCHX:  Social History     Tobacco Use    Smoking status: Former     Current packs/day: 0.00     Average packs/day: 1 pack/day for 24.0 years (24.0 ttl pk-yrs)     Types: Cigarettes     Start date: 1991     Quit date: 2015     Years since quitting: 10.0    Smokeless tobacco: Never    Tobacco comments:     18- quite 8 years     1 PPD   Substance Use Topics    Alcohol use: Yes     Alcohol/week: 14.0 standard drinks of alcohol     Types: 14 Shots of liquor per week       ALLERGIES:  Neosporin [benzalkonium chloride] and Promethazine    CURRENT MEDICATIONS:  Current Outpatient Medications on File Prior to Visit   Medication Sig Dispense Refill    aspirin (ECOTRIN) 81 MG EC  "tablet Take 81 mg by mouth.      lisinopriL 10 MG tablet Take 1 tablet (10 mg total) by mouth once daily. 90 tablet 1    meloxicam (MOBIC) 15 MG tablet TAKE 1 TABLET BY MOUTH EVERY DAY 90 tablet 1    rosuvastatin (CRESTOR) 10 MG tablet Take 1 tablet (10 mg total) by mouth every evening. 90 tablet 1    scopolamine hydrobromide (SCOPOLAMINE HBR ORAL) Take 4 mg by mouth as needed (motion sickness).      tiZANidine (ZANAFLEX) 2 MG tablet Take 1.5 tablets (3 mg total) by mouth every evening. 135 tablet 0    atomoxetine (STRATTERA) 10 MG capsule Take 1 capsule (10 mg total) by mouth once daily. 30 capsule 0    valACYclovir (VALTREX) 500 MG tablet Take 500 mg by mouth.       No current facility-administered medications on file prior to visit.       REVIEW OF SYSTEMS:  ROS    GENERAL PHYSICAL EXAM:   /68   Pulse 60   Ht 5' 8" (1.727 m)   Wt 73.8 kg (162 lb 11.2 oz)   BMI 24.74 kg/m²    GEN: well developed, well nourished, no acute distress   HENT: Normocephalic, atraumatic   EYES: No discharge, conjunctiva normal   NECK: Supple, non-tender   PULM: No wheezing, no respiratory distress   CV: RRR   ABD: Soft, non-tender    ORTHO EXAM:    Examination of bilateral hands and wrist reveals that there is no edema.  There are no skin changes.  Sensation is intact in the median radial ulnar distribution.  Has negative Tinel's but positive Durkan's.  Has 2+ radial pulse    RADIOLOGY:   EMG nerve conduction study has been reviewed.  It is consistent with moderate bilateral carpal tunnel syndrome    ASSESSMENT:   Bilateral carpal tunnel syndrome    PLAN:  1. I have discussed treatment options.  I have discussed the possibility of carpal tunnel release as he has failed 2 steroid injections.  I have discussed the risks and benefits of the procedure and consent has been obtained    2.  Will proceed with left carpal tunnel release under local anesthesia     3.  He will follow up with me 2 weeks after the surgery      "

## 2025-01-13 DIAGNOSIS — G56.02 CARPAL TUNNEL SYNDROME OF LEFT WRIST: Primary | ICD-10-CM

## 2025-01-13 RX ORDER — CEFAZOLIN SODIUM 2 G/50ML
2 SOLUTION INTRAVENOUS
Status: CANCELLED | OUTPATIENT
Start: 2025-01-13

## 2025-01-30 ENCOUNTER — HOSPITAL ENCOUNTER (OUTPATIENT)
Facility: HOSPITAL | Age: 50
Discharge: HOME | End: 2025-01-30
Attending: ORTHOPAEDIC SURGERY | Admitting: ORTHOPAEDIC SURGERY
Payer: OTHER GOVERNMENT

## 2025-01-30 VITALS
WEIGHT: 162 LBS | HEIGHT: 68 IN | DIASTOLIC BLOOD PRESSURE: 68 MMHG | BODY MASS INDEX: 24.55 KG/M2 | RESPIRATION RATE: 16 BRPM | TEMPERATURE: 98 F | SYSTOLIC BLOOD PRESSURE: 120 MMHG | OXYGEN SATURATION: 98 % | HEART RATE: 74 BPM

## 2025-01-30 DIAGNOSIS — G56.02 CARPAL TUNNEL SYNDROME OF LEFT WRIST: ICD-10-CM

## 2025-01-30 PROCEDURE — 99499 UNLISTED E&M SERVICE: CPT | Mod: AS,,, | Performed by: PHYSICIAN ASSISTANT

## 2025-01-30 PROCEDURE — 71000015 HC POSTOP RECOV 1ST HR: Mod: PO | Performed by: ORTHOPAEDIC SURGERY

## 2025-01-30 PROCEDURE — 36000707: Mod: PO | Performed by: ORTHOPAEDIC SURGERY

## 2025-01-30 PROCEDURE — 63600175 PHARM REV CODE 636 W HCPCS: Mod: PO | Performed by: ORTHOPAEDIC SURGERY

## 2025-01-30 PROCEDURE — 36000706: Mod: PO | Performed by: ORTHOPAEDIC SURGERY

## 2025-01-30 PROCEDURE — 64721 CARPAL TUNNEL SURGERY: CPT | Mod: LT,,, | Performed by: ORTHOPAEDIC SURGERY

## 2025-01-30 PROCEDURE — 63600175 PHARM REV CODE 636 W HCPCS: Mod: PO | Performed by: PHYSICIAN ASSISTANT

## 2025-01-30 RX ORDER — LIDOCAINE HYDROCHLORIDE 10 MG/ML
INJECTION, SOLUTION EPIDURAL; INFILTRATION; INTRACAUDAL; PERINEURAL
Status: DISCONTINUED | OUTPATIENT
Start: 2025-01-30 | End: 2025-01-30 | Stop reason: HOSPADM

## 2025-01-30 RX ORDER — BUPIVACAINE HYDROCHLORIDE 2.5 MG/ML
INJECTION, SOLUTION EPIDURAL; INFILTRATION; INTRACAUDAL
Status: DISCONTINUED | OUTPATIENT
Start: 2025-01-30 | End: 2025-01-30 | Stop reason: HOSPADM

## 2025-01-30 RX ORDER — CEFAZOLIN SODIUM 1 G/3ML
2 INJECTION, POWDER, FOR SOLUTION INTRAMUSCULAR; INTRAVENOUS
Status: COMPLETED | OUTPATIENT
Start: 2025-01-30 | End: 2025-01-30

## 2025-01-30 RX ORDER — OXYCODONE HYDROCHLORIDE 5 MG/1
5 TABLET ORAL EVERY 4 HOURS PRN
Qty: 6 TABLET | Refills: 0 | Status: SHIPPED | OUTPATIENT
Start: 2025-01-30

## 2025-01-30 NOTE — DISCHARGE INSTRUCTIONS
Procedure: left carpal tunnel release    1. Please keep the dressing clean, dry, and in place. Do not take it off and do not get it wet.    2. Please keep the left arm and hand elevated for the 1st 24-48 hours to prevent swelling    3. Flexion and extension of the exposed fingers is encouraged, but do not attempt to push off or lift more than 1-2 pounds with left arm or hand    4. Please limit weightbearing to the left hand to 1-2 pounds. Light activity such as brushing your teeth, using a fork, or lifting a small drink is allowed starting today.    5. Pain medication and nausea medication have been prescribed. Please take them as necessary    6. If there are any questions or concerns please call Dr. Montilla's office at [phone]    7.  Follow up with Dr. Montilla in 2 weeks

## 2025-01-30 NOTE — DISCHARGE SUMMARY
[facility] - [facility]  Discharge Note  Short Stay    Procedure(s) (LRB):  Left carpal tunnel release (Left)      OUTCOME: Patient tolerated treatment/procedure well without complication and is now ready for discharge.    DISPOSITION: Home or Self Care    FINAL DIAGNOSIS:  Carpal tunnel syndrome of left wrist    FOLLOWUP: In clinic    DISCHARGE INSTRUCTIONS:    Discharge Procedure Orders   Diet general     Activity as tolerated     Keep surgical extremity elevated     Lifting restrictions   Order Comments: Please limit lifting with the left arm and hand to 2-3 lb     Leave dressing on - Keep it clean, dry, and intact until clinic visit     Call MD for:  temperature >100.4     Call MD for:  persistent nausea and vomiting     Call MD for:  severe uncontrolled pain     Call MD for:  difficulty breathing, headache or visual disturbances     Call MD for:  redness, tenderness, or signs of infection (pain, swelling, redness, odor or green/yellow discharge around incision site)     Call MD for:  hives     Call MD for:  persistent dizziness or light-headedness     Call MD for:  extreme fatigue        TIME SPENT ON DISCHARGE: 15 minutes

## 2025-01-30 NOTE — OP NOTE
Eddie Brooks  1975    DATE OF SURGERY: 1/30/2025     PRE-OPERATIVE DIAGNOSIS: left Carpal Tunnel Syndrome    POST-OPERATIVE DIAGNOSIS: left Carpal Tunnel Syndrome     ANESTHESIA TYPE: Local    BLOOD LOSS:  Less than 10 cc    TOURNIQUET TIME:  9 minutes    SURGEON: Dr. Montilla    ASSISTANT:  Rodney Hurtado    PROCEDURE: left Carpal Tunnel Release    IMPLANTS: None    SPECIMENS: None    INDICATION:     Mr. Brooks presented to my clinic with a history of left carpal tunnel symptoms. Conservative treatments were initially tried. The patient did have relief with attempts at conservative treatment however has had a return of symptoms. An EMG and nerve conduction study has been performed. That study has shown compression of the median nerve at the wrist consistent with carpal tunnel syndrome. A discussion of the risks and benefits of carpal tunnel release has been performed, and informed consent for the procedure has been obtained.    PROCEDURE IN DETAIL:     Mr. Brooks was transported to the operating room and was placed supine on the operating room table. All appropriate points were padded. The left hand and arm was prepped and draped in the normal sterile fashion. Time out was called. The correct patient, correct operative site, correct procedure, antibiotic administration which consisted of 2 g of Ancef, and allergies to medications which are to Neosporin [benzalkonium chloride] and Promethazine  were reviewed. Time in was then called.     Attention was turned to left hand where a 3 cm incision was made in the palm of the hand. This was carried through the skin and subcutaneous tissues were dissected bluntly. The superficial palmar fascia was identified and was split in line with its fibers. The transverse carpal ligament was then identified and was incised for a distance of approximately 1 cm sharply.The median nerve was visualized and a carpal tunnel sled was placed below the transverse carpal ligament  but above the median nerve. Blunt dissection proximally over the transverse carpal ligament was performed and elevator was placed just above the transverse carpal ligament proximally. The ligament was identified. There were noted to be no penetrating nerve branches and at that point the carpal tunnel knife was used to incise the proximal transverse carpal ligament. Attention was then turned to the distal portion of the transverse carpal ligament. The carpal tunnel sled was again placed underneath the transverse carpal ligament but above the median nerve distally. Blunt dissection above the transverse carpal ligament distally was performed and an elevator was placed. The distal portion of the transverse carpal ligament was visualized and there were noted to be no penetrating branches of the nerve. At that point, tenotomy scissors were used to transect the distal portion of the transverse carpal ligament under direct visualization. The median nerve was then visualized. There were noted to be no specific lesions of the nerve and all of its branches were noted to be intact. The wound was then irrigated copiously. The tourniquet was let down and meticulous hemostasis was obtained with bipolar cautery. The wound was closed with 4-0 nylon suture superficially. The wound was then dressed with Xeroform, 4 x 4's, cast padding and a 3 inch Ace wrap was placed.      The patient was stable in the operating room and was transported to the recovery room in stable condition. All lap, needle, sponge, and equipment counts were correct at the end of the case.    POST-OPERATIVE PLAN:     The patient will keep a soft dressing in place for two weeks at which time he will followup with me. The dressing will be taken down, and the sutures will be removed at that time. He is not to get the dressing wet or to take it off. He will have a 2 pound weight limit of the left upper extremity for a total of 4 weeks.

## 2025-02-12 ENCOUNTER — TELEPHONE (OUTPATIENT)
Dept: ORTHOPEDICS | Facility: CLINIC | Age: 50
End: 2025-02-12
Payer: OTHER GOVERNMENT

## 2025-02-12 NOTE — TELEPHONE ENCOUNTER
"Spoke with caller and let him know we could not get him in today but we could see him Monday. The patient then stated "I will just take them out myself". Patient was advised that he should not do this and I emphasized the need for him to come in for the 2 week post op. The call was then disconnected by the patient.   ----- Message from [employer] sent at 2/12/2025  8:26 AM CST -----  Contact: Self  Type:  Same Day Appointment Request    Caller is requesting a same day appointment.  Caller declined first available appointment listed below.      Name of Caller:  Patient  When is the first available appointment?  02/14  Symptoms:  Post Op for stitches to be removed  Best Call Back Number:  [phone]  Additional Information:   Pt is unable to make it Friday since he is overbooked at his office and wants to see if he can be worked in anytime today for this. Please call pt back to assist. Thank You  "

## 2025-02-14 ENCOUNTER — OFFICE VISIT (OUTPATIENT)
Dept: ORTHOPEDICS | Facility: CLINIC | Age: 50
End: 2025-02-14
Payer: OTHER GOVERNMENT

## 2025-02-14 VITALS — HEIGHT: 68 IN | BODY MASS INDEX: 24.56 KG/M2 | WEIGHT: 162.06 LBS

## 2025-02-14 DIAGNOSIS — Z98.890 STATUS POST CARPAL TUNNEL RELEASE: Primary | ICD-10-CM

## 2025-02-14 PROCEDURE — 99999 PR PBB SHADOW E&M-EST. PATIENT-LVL III: CPT | Mod: PBBFAC,,, | Performed by: ORTHOPAEDIC SURGERY

## 2025-02-14 PROCEDURE — 99213 OFFICE O/P EST LOW 20 MIN: CPT | Mod: PBBFAC,PO | Performed by: ORTHOPAEDIC SURGERY

## 2025-02-14 NOTE — PROGRESS NOTES
Mr Brooks returns to clinic today.  He is status post left carpal tunnel release.  He is overall doing well.      Physical exam: Examination of the left hand reveals that there is minimal edema.  There was no erythema.  The wound is healing well.  Sutures remain in place.  He is neurovascularly intact distally.      Assessment: Status post left carpal tunnel release     Plan:     1.  Sutures were removed today and Steri-Strips are placed     2.  Will continue with 2-3 lb weight limit     3. Will follow up in 2 weeks.  We will do the paperwork in the [facility] and [facility] at his next visit for his right carpal tunnel release which we have scheduled for mid March

## 2025-02-17 DIAGNOSIS — G56.01 CARPAL TUNNEL SYNDROME OF RIGHT WRIST: Primary | ICD-10-CM

## 2025-02-17 RX ORDER — CEFAZOLIN SODIUM 2 G/50ML
2 SOLUTION INTRAVENOUS
OUTPATIENT
Start: 2025-02-17

## 2025-02-21 ENCOUNTER — TELEPHONE (OUTPATIENT)
Dept: ORTHOPEDICS | Facility: CLINIC | Age: 50
End: 2025-02-21
Payer: OTHER GOVERNMENT

## 2025-02-21 NOTE — TELEPHONE ENCOUNTER
----- Message from Med Assistant Clark sent at 2/21/2025  8:11 AM CST -----  Contact: patient  Cannot come in on 2/28/25 and needs to reschedule very soon??Call back number is [phone]  
Spoke with patient. Appointment rescheduled to 3/7 at 8:20 with Rodney Montilla.   
Physical Exam:  Gen: NAD, AOx3, non-toxic appearing, able to ambulate without assistance  Head: NCAT  HEENT: EOMI, PEERLA, normal conjunctiva, tongue midline, oral mucosa moist, no lesions in oropharynx   Lung: CTAB, no respiratory distress, no wheezes/rhonchi/rales B/L, speaking in full sentences  CV: RRR, no murmurs, rubs or gallops  Abd: soft, NT, ND, no guarding, no rigidity, no rebound tenderness, no CVA tenderness   MSK: no visible deformities, ROM normal in UE/LE, no back pain  Neuro: No focal sensory or motor deficits  Skin: Warm, well perfused, no rash, no leg swelling  Psych: normal affect, calm  ~Raffi Bobo D.O. -Resident

## 2025-03-07 ENCOUNTER — OFFICE VISIT (OUTPATIENT)
Dept: ORTHOPEDICS | Facility: CLINIC | Age: 50
End: 2025-03-07
Payer: OTHER GOVERNMENT

## 2025-03-07 DIAGNOSIS — Z98.890 STATUS POST CARPAL TUNNEL RELEASE: Primary | ICD-10-CM

## 2025-03-07 PROCEDURE — 99213 OFFICE O/P EST LOW 20 MIN: CPT | Mod: PBBFAC,PO | Performed by: PHYSICIAN ASSISTANT

## 2025-03-07 PROCEDURE — 99999 PR PBB SHADOW E&M-EST. PATIENT-LVL III: CPT | Mod: PBBFAC,,, | Performed by: PHYSICIAN ASSISTANT

## 2025-03-07 NOTE — H&P (VIEW-ONLY)
3/7/2025    HPI:  Eddie Brooks is a 49 y.o. male, who presents to clinic today for follow up status post left carpal tunnel release.  He is about 5 weeks status post surgery.  States he is also here to have his right carpal tunnel release evaluated.  States he has had complete resolution of his left carpal tunnel symptoms.  States he continues to have significant right carpal tunnel symptoms.  States that has have some hypersensitivity around the surgical site of the left wrist.  Denies any other complaints at this time.    PMHX:  Past Medical History:   Diagnosis Date    Hypercholesteremia     Hypertension     Malignant melanoma of skin, unspecified     Peptic ulcer disease     Sleep apnea, unspecified     TIA (transient ischemic attack)     pt states this was questionable diagnosis. Thinks it was more likely anxiety attack (shaking hands, ? facial dropping)    Tubular adenoma        PSHX:  Past Surgical History:   Procedure Laterality Date    CARPAL TUNNEL RELEASE Left 1/30/2025    Procedure: Left carpal tunnel release;  Surgeon: Abel Montilla MD;  Location: [facility] OR;  Service: Orthopedics;  Laterality: Left;    COLONOSCOPY W/ POLYPECTOMY  09/27/2022    DISTAL BICEPS TENDON REPAIR Left 2017    EXCISION OF MELANOMA Left     leg/calf    KNEE ARTHROSCOPY W/ MENISCECTOMY Right     2020       FMHX:  Family History   Problem Relation Name Age of Onset    Kidney cancer Mother      Brain cancer Mother      Uterine cancer Mother      Alcohol abuse Father      Prostate cancer Neg Hx         SOCHX:  Social History     Tobacco Use    Smoking status: Former     Current packs/day: 0.00     Average packs/day: 1 pack/day for 24.0 years (24.0 ttl pk-yrs)     Types: Cigarettes     Start date: 1991     Quit date: 2015     Years since quitting: 10.1    Smokeless tobacco: Never    Tobacco comments:     18- quite 8 years     1 PPD   Substance Use Topics    Alcohol use: Yes     Alcohol/week: 14.0 standard drinks of alcohol      Types: 14 Shots of liquor per week       ALLERGIES:  Neosporin [benzalkonium chloride] and Promethazine    CURRENT MEDICATIONS:  Medications Ordered Prior to Encounter[1]    REVIEW OF SYSTEMS:  Review of Systems Complete; Negative, unless noted above.    GENERAL PHYSICAL EXAM:   There were no vitals taken for this visit.   GEN: well developed, well nourished, no acute distress   PULM: No wheezing, no respiratory distress   CV: RRR    ORTHO EXAM:   Examination of the left wrist reveals a well-healed surgical site.  No edema, erythema, ecchymosis, or skin breakdown.  Presence of significant amount of scar tissue formation noted around the surgical site.  Presence of a mild amount of hypersensitivity around the surgical site.  Able make composite fist and fully extend fingers.  Thenar motor function remains intact.  5/5 /intrinsic strength.  Normal sensation in the radial, ulnar, median nerve distributions.  Capillary refill is 2 seconds.     Examination of the right wrist reveals no edema, erythema, ecchymosis, or skin breakdown.  Able make composite fist and fully extend fingers.  Full intact range of motion of the right wrist.  5/5 /intrinsic strength.  5/5 thenar strength.  Negative carpal Tinel's test.  Positive Durkan's test.  Normal sensation in the radial, ulnar, and median nerve distributions.  Capillary refill is less than 2 seconds.    RADIOLOGY:   None.    ASSESSMENT:   Status post left carpal tunnel release, right carpal tunnel syndrome    PLAN:  1. I discussed with Eddie Thorntonkorin that considering he has done very well with his left carpal tunnel release, that we would proceed with a right carpal tunnel release.  He verbally agreed with the treatment plan.      2.  We discussed the risks and benefits of performing surgery, as well as not performing surgery.  All questions were addressed and answered in clinic today.  Surgical consents were signed in clinic today     3. We will proceed with a  right carpal tunnel release under local anesthesia.      4.  I would like him follow up in clinic 2 weeks postoperatively.  He was instructed to contact clinic for any problems or concerns in the interim.           [1]   Current Outpatient Medications on File Prior to Visit   Medication Sig Dispense Refill    aspirin (ECOTRIN) 81 MG EC tablet Take 81 mg by mouth.      lisinopriL 10 MG tablet Take 1 tablet (10 mg total) by mouth once daily. 90 tablet 1    meloxicam (MOBIC) 15 MG tablet TAKE 1 TABLET BY MOUTH EVERY DAY 90 tablet 1    oxyCODONE (ROXICODONE) 5 MG immediate release tablet Take 1 tablet (5 mg total) by mouth every 4 (four) hours as needed for Pain. 6 tablet 0    rosuvastatin (CRESTOR) 10 MG tablet Take 1 tablet (10 mg total) by mouth every evening. 90 tablet 1    scopolamine hydrobromide (SCOPOLAMINE HBR ORAL) Take 4 mg by mouth as needed (motion sickness).      tiZANidine (ZANAFLEX) 2 MG tablet Take 1.5 tablets (3 mg total) by mouth every evening. 135 tablet 0    valACYclovir (VALTREX) 500 MG tablet Take 500 mg by mouth.       No current facility-administered medications on file prior to visit.

## 2025-03-07 NOTE — PROGRESS NOTES
3/7/2025    HPI:  Eddie Brooks is a 49 y.o. male, who presents to clinic today for follow up status post left carpal tunnel release.  He is about 5 weeks status post surgery.  States he is also here to have his right carpal tunnel release evaluated.  States he has had complete resolution of his left carpal tunnel symptoms.  States he continues to have significant right carpal tunnel symptoms.  States that has have some hypersensitivity around the surgical site of the left wrist.  Denies any other complaints at this time.    PMHX:  Past Medical History:   Diagnosis Date    Hypercholesteremia     Hypertension     Malignant melanoma of skin, unspecified     Peptic ulcer disease     Sleep apnea, unspecified     TIA (transient ischemic attack)     pt states this was questionable diagnosis. Thinks it was more likely anxiety attack (shaking hands, ? facial dropping)    Tubular adenoma        PSHX:  Past Surgical History:   Procedure Laterality Date    CARPAL TUNNEL RELEASE Left 1/30/2025    Procedure: Left carpal tunnel release;  Surgeon: Abel Monitlla MD;  Location: [facility] OR;  Service: Orthopedics;  Laterality: Left;    COLONOSCOPY W/ POLYPECTOMY  09/27/2022    DISTAL BICEPS TENDON REPAIR Left 2017    EXCISION OF MELANOMA Left     leg/calf    KNEE ARTHROSCOPY W/ MENISCECTOMY Right     2020       FMHX:  Family History   Problem Relation Name Age of Onset    Kidney cancer Mother      Brain cancer Mother      Uterine cancer Mother      Alcohol abuse Father      Prostate cancer Neg Hx         SOCHX:  Social History     Tobacco Use    Smoking status: Former     Current packs/day: 0.00     Average packs/day: 1 pack/day for 24.0 years (24.0 ttl pk-yrs)     Types: Cigarettes     Start date: 1991     Quit date: 2015     Years since quitting: 10.1    Smokeless tobacco: Never    Tobacco comments:     16- quite 8 years     1 PPD   Substance Use Topics    Alcohol use: Yes     Alcohol/week: 14.0 standard drinks of alcohol      Types: 14 Shots of liquor per week       ALLERGIES:  Neosporin [benzalkonium chloride] and Promethazine    CURRENT MEDICATIONS:  Medications Ordered Prior to Encounter[1]    REVIEW OF SYSTEMS:  Review of Systems Complete; Negative, unless noted above.    GENERAL PHYSICAL EXAM:   There were no vitals taken for this visit.   GEN: well developed, well nourished, no acute distress   PULM: No wheezing, no respiratory distress   CV: RRR    ORTHO EXAM:   Examination of the left wrist reveals a well-healed surgical site.  No edema, erythema, ecchymosis, or skin breakdown.  Presence of significant amount of scar tissue formation noted around the surgical site.  Presence of a mild amount of hypersensitivity around the surgical site.  Able make composite fist and fully extend fingers.  Thenar motor function remains intact.  5/5 /intrinsic strength.  Normal sensation in the radial, ulnar, median nerve distributions.  Capillary refill is 2 seconds.     Examination of the right wrist reveals no edema, erythema, ecchymosis, or skin breakdown.  Able make composite fist and fully extend fingers.  Full intact range of motion of the right wrist.  5/5 /intrinsic strength.  5/5 thenar strength.  Negative carpal Tinel's test.  Positive Durkan's test.  Normal sensation in the radial, ulnar, and median nerve distributions.  Capillary refill is less than 2 seconds.    RADIOLOGY:   None.    ASSESSMENT:   Status post left carpal tunnel release, right carpal tunnel syndrome    PLAN:  1. I discussed with Eddie Thorntonkorin that considering he has done very well with his left carpal tunnel release, that we would proceed with a right carpal tunnel release.  He verbally agreed with the treatment plan.      2.  We discussed the risks and benefits of performing surgery, as well as not performing surgery.  All questions were addressed and answered in clinic today.  Surgical consents were signed in clinic today     3. We will proceed with a  right carpal tunnel release under local anesthesia.      4.  I would like him follow up in clinic 2 weeks postoperatively.  He was instructed to contact clinic for any problems or concerns in the interim.           [1]   Current Outpatient Medications on File Prior to Visit   Medication Sig Dispense Refill    aspirin (ECOTRIN) 81 MG EC tablet Take 81 mg by mouth.      lisinopriL 10 MG tablet Take 1 tablet (10 mg total) by mouth once daily. 90 tablet 1    meloxicam (MOBIC) 15 MG tablet TAKE 1 TABLET BY MOUTH EVERY DAY 90 tablet 1    oxyCODONE (ROXICODONE) 5 MG immediate release tablet Take 1 tablet (5 mg total) by mouth every 4 (four) hours as needed for Pain. 6 tablet 0    rosuvastatin (CRESTOR) 10 MG tablet Take 1 tablet (10 mg total) by mouth every evening. 90 tablet 1    scopolamine hydrobromide (SCOPOLAMINE HBR ORAL) Take 4 mg by mouth as needed (motion sickness).      tiZANidine (ZANAFLEX) 2 MG tablet Take 1.5 tablets (3 mg total) by mouth every evening. 135 tablet 0    valACYclovir (VALTREX) 500 MG tablet Take 500 mg by mouth.       No current facility-administered medications on file prior to visit.

## 2025-03-11 ENCOUNTER — HOSPITAL ENCOUNTER (OUTPATIENT)
Facility: HOSPITAL | Age: 50
Discharge: HOME | End: 2025-03-11
Attending: ORTHOPAEDIC SURGERY | Admitting: ORTHOPAEDIC SURGERY
Payer: OTHER GOVERNMENT

## 2025-03-11 DIAGNOSIS — G56.01 CARPAL TUNNEL SYNDROME OF RIGHT WRIST: ICD-10-CM

## 2025-03-11 PROCEDURE — 36000707: Mod: PO | Performed by: ORTHOPAEDIC SURGERY

## 2025-03-11 PROCEDURE — 71000015 HC POSTOP RECOV 1ST HR: Mod: PO | Performed by: ORTHOPAEDIC SURGERY

## 2025-03-11 PROCEDURE — 36000706: Mod: PO | Performed by: ORTHOPAEDIC SURGERY

## 2025-03-11 PROCEDURE — 63600175 PHARM REV CODE 636 W HCPCS: Mod: PO | Performed by: ORTHOPAEDIC SURGERY

## 2025-03-11 PROCEDURE — 64721 CARPAL TUNNEL SURGERY: CPT | Mod: 79,RT,, | Performed by: ORTHOPAEDIC SURGERY

## 2025-03-11 PROCEDURE — 63600175 PHARM REV CODE 636 W HCPCS: Mod: PO | Performed by: PHYSICIAN ASSISTANT

## 2025-03-11 RX ORDER — IBUPROFEN 800 MG/1
800 TABLET ORAL EVERY 6 HOURS PRN
Qty: 10 TABLET | Refills: 0 | Status: SHIPPED | OUTPATIENT
Start: 2025-03-11

## 2025-03-11 RX ORDER — BUPIVACAINE HYDROCHLORIDE 2.5 MG/ML
INJECTION, SOLUTION EPIDURAL; INFILTRATION; INTRACAUDAL; PERINEURAL
Status: DISCONTINUED | OUTPATIENT
Start: 2025-03-11 | End: 2025-03-11 | Stop reason: HOSPADM

## 2025-03-11 RX ORDER — CEFAZOLIN SODIUM 1 G/3ML
2 INJECTION, POWDER, FOR SOLUTION INTRAMUSCULAR; INTRAVENOUS
Status: COMPLETED | OUTPATIENT
Start: 2025-03-11 | End: 2025-03-11

## 2025-03-11 RX ORDER — LIDOCAINE HYDROCHLORIDE 10 MG/ML
INJECTION, SOLUTION EPIDURAL; INFILTRATION; INTRACAUDAL; PERINEURAL
Status: DISCONTINUED | OUTPATIENT
Start: 2025-03-11 | End: 2025-03-11 | Stop reason: HOSPADM

## 2025-03-11 NOTE — DISCHARGE INSTRUCTIONS
Procedure: right carpal tunnel release    1. Please keep the dressing clean, dry, and in place. Do not take it off and do not get it wet.    2. Please keep the right arm and hand elevated for the 1st 24-48 hours to prevent swelling    3. Flexion and extension of the exposed fingers is encouraged, but do not attempt to push off or lift more than 1-2 pounds with right arm or hand    4. Please limit weightbearing to the right hand to 1-2 pounds. Light activity such as brushing your teeth, using a fork, or lifting a small drink is allowed starting today.    5. High-dose ibuprofen has been prescribed for pain.  Please take them as necessary    6. If there are any questions or concerns please call Dr. Montilla's office at [phone]    7.  Follow up with Dr. Montilla in 2 weeks

## 2025-03-11 NOTE — OP NOTE
Eddie Brooks  1975    DATE OF SURGERY: 3/11/2025     PRE-OPERATIVE DIAGNOSIS: right Carpal Tunnel Syndrome    POST-OPERATIVE DIAGNOSIS: right Carpal Tunnel Syndrome     ANESTHESIA TYPE: Local    BLOOD LOSS:  Less than 10 cc    TOURNIQUET TIME:  Approximately 8 minutes    SURGEON: Dr. Montilla    ASSISTANT:  Leila Putnam    PROCEDURE: right Carpal Tunnel Release    IMPLANTS: None    SPECIMENS: None    INDICATION:     Mr. Brooks presented to my clinic with a history of right carpal tunnel symptoms. Conservative treatments were initially tried. The patient did have relief with attempts at conservative treatment however has had a return of symptoms. An EMG and nerve conduction study has been performed. That study has shown compression of the median nerve at the wrist consistent with carpal tunnel syndrome. A discussion of the risks and benefits of carpal tunnel release has been performed, and informed consent for the procedure has been obtained.    PROCEDURE IN DETAIL:     Mr. Brooks was transported to the operating room and was placed supine on the operating room table. All appropriate points were padded. The right hand and arm was prepped and draped in the normal sterile fashion. Time out was called. The correct patient, correct operative site, correct procedure, antibiotic administration which consisted of 2 g of Ancef, and allergies to medications which are to Neosporin [benzalkonium chloride] and Promethazine  were reviewed. Time in was then called.     Attention was turned to right hand where a 3 cm incision was made in the palm of the hand. This was carried through the skin and subcutaneous tissues were dissected bluntly. The superficial palmar fascia was identified and was split in line with its fibers. The transverse carpal ligament was then identified and was incised for a distance of approximately 1 cm sharply.The median nerve was visualized and a carpal tunnel sled was placed below the  transverse carpal ligament but above the median nerve. Blunt dissection proximally over the transverse carpal ligament was performed and elevator was placed just above the transverse carpal ligament proximally. The ligament was identified. There were noted to be no penetrating nerve branches and at that point the carpal tunnel knife was used to incise the proximal transverse carpal ligament. Attention was then turned to the distal portion of the transverse carpal ligament. The carpal tunnel sled was again placed underneath the transverse carpal ligament but above the median nerve distally. Blunt dissection above the transverse carpal ligament distally was performed and an elevator was placed. The distal portion of the transverse carpal ligament was visualized and there were noted to be no penetrating branches of the nerve. At that point, tenotomy scissors were used to transect the distal portion of the transverse carpal ligament under direct visualization. The median nerve was then visualized. There were noted to be no specific lesions of the nerve and all of its branches were noted to be intact. The wound was then irrigated copiously. The tourniquet was let down and meticulous hemostasis was obtained with bipolar cautery. The wound was closed with 4-0 nylon suture superficially. The wound was then dressed with Xeroform, 4 x 4's, cast padding and a 3 inch Ace wrap was placed.      The patient was stable in the operating room and was transported to the recovery room in stable condition. All lap, needle, sponge, and equipment counts were correct at the end of the case.    POST-OPERATIVE PLAN:     The patient will keep a soft dressing in place for two weeks at which time he will followup with me. The dressing will be taken down, and the sutures will be removed at that time. He is not to get the dressing wet or to take it off. He will have a 2 pound weight limit of the left upper extremity for a total of 4 weeks.

## 2025-03-11 NOTE — DISCHARGE SUMMARY
[facility] - Surgery  Discharge Note  Short Stay    Procedure(s) (LRB):  Right carpal tunnel release (Right)      OUTCOME: Patient tolerated treatment/procedure well without complication and is now ready for discharge.    DISPOSITION: Home or Self Care    FINAL DIAGNOSIS:  Carpal tunnel syndrome of right wrist    FOLLOWUP: In clinic    DISCHARGE INSTRUCTIONS:    Discharge Procedure Orders   Diet general     Activity as tolerated     Keep surgical extremity elevated     Lifting restrictions   Order Comments: Please limit lifting with the right arm and hand to 2-3 lb     Leave dressing on - Keep it clean, dry, and intact until clinic visit     Call MD for:  temperature >100.4     Call MD for:  persistent nausea and vomiting     Call MD for:  severe uncontrolled pain     Call MD for:  difficulty breathing, headache or visual disturbances     Call MD for:  redness, tenderness, or signs of infection (pain, swelling, redness, odor or green/yellow discharge around incision site)     Call MD for:  hives     Call MD for:  persistent dizziness or light-headedness     Call MD for:  extreme fatigue        TIME SPENT ON DISCHARGE: 15 minutes

## 2025-03-12 VITALS
BODY MASS INDEX: 24.55 KG/M2 | OXYGEN SATURATION: 100 % | HEART RATE: 60 BPM | TEMPERATURE: 98 F | SYSTOLIC BLOOD PRESSURE: 138 MMHG | DIASTOLIC BLOOD PRESSURE: 72 MMHG | HEIGHT: 68 IN | RESPIRATION RATE: 18 BRPM | WEIGHT: 162 LBS

## 2025-03-26 ENCOUNTER — OFFICE VISIT (OUTPATIENT)
Dept: ORTHOPEDICS | Facility: CLINIC | Age: 50
End: 2025-03-26
Payer: OTHER GOVERNMENT

## 2025-03-26 DIAGNOSIS — Z98.890 STATUS POST CARPAL TUNNEL RELEASE: Primary | ICD-10-CM

## 2025-03-26 PROCEDURE — 99212 OFFICE O/P EST SF 10 MIN: CPT | Mod: PBBFAC,PO | Performed by: ORTHOPAEDIC SURGERY

## 2025-03-26 PROCEDURE — 99999 PR PBB SHADOW E&M-EST. PATIENT-LVL II: CPT | Mod: PBBFAC,,, | Performed by: ORTHOPAEDIC SURGERY

## 2025-03-26 PROCEDURE — 99024 POSTOP FOLLOW-UP VISIT: CPT | Mod: ,,, | Performed by: ORTHOPAEDIC SURGERY

## 2025-03-26 NOTE — PROGRESS NOTES
Eddie Brooks is a 49 y.o. male who is approximately 2 weeks status post right carpal tunnel release. He is doing very well. He states that the majority of carpal tunnel symptoms are improved or resolved.    Physical exam: Examination of the right hand reveals that the incision is healing well. There is no significant edema,  erythema or drainage. Sensation is grossly intact median radial and ulnar distributions. Motor function of the thenar musculature is intact. Capillary refill less than 2 seconds in all of the digits. The Patient is able to make a full composite fist and fully extend the fingers without significant pain.    Assessment: Status post right carpal tunnel release    Plan:    1. Sutures were removed today and Steri-Strips are placed    2. Can begin hand washing in running water tomorrow    3. Continue a 2-3 pound weight limit to the arm and hand    4. Follow up with me in 2 weeks for repeat evaluation

## 2025-04-09 ENCOUNTER — OFFICE VISIT (OUTPATIENT)
Dept: ORTHOPEDICS | Facility: CLINIC | Age: 50
End: 2025-04-09
Payer: OTHER GOVERNMENT

## 2025-04-09 VITALS — BODY MASS INDEX: 24.56 KG/M2 | HEIGHT: 68 IN | WEIGHT: 162.06 LBS

## 2025-04-09 DIAGNOSIS — Z98.890 STATUS POST CARPAL TUNNEL RELEASE: Primary | ICD-10-CM

## 2025-04-09 PROCEDURE — 99999 PR PBB SHADOW E&M-EST. PATIENT-LVL III: CPT | Mod: PBBFAC,,, | Performed by: PHYSICIAN ASSISTANT

## 2025-04-09 PROCEDURE — 99213 OFFICE O/P EST LOW 20 MIN: CPT | Mod: PBBFAC,PO | Performed by: PHYSICIAN ASSISTANT

## 2025-04-09 NOTE — PROGRESS NOTES
Eddie Brooks is a 50 y.o. male who presents to clinic for follow up status post left carpal tunnel release.  He is about 4 weeks status post surgery.  States overall he is doing very well.  States he has had complete resolution of his carpal tunnel symptoms.  States he has some mild soreness of the surgical site, but is otherwise doing very well.  Denies any acute injuries since his last visit.  Denies any other complaints at this time.    Physical Exam:  Examination of the right wrist reveals a well-healed surgical site.  No edema, erythema, ecchymosis or skin breakdown.  Presence of scar tissue formation noted around the surgical site.  Presence of minimal hypersensitivity of the surgical site.  Able make composite fist and full extend all fingers.  5/5 /intrinsic strength.  5/5 thenar strength.  Normal sensation in the radial, ulnar, median nerve distributions.  Capillary refill is than 2 seconds.    Radiology:  None.    Assessment:  Status post right carpal tunnel release    Plan:  1. I discussed with Eddie Brooks that he has progressed very well in the postoperative course we discussed the best course of action this time is to gradually return to normal activity as tolerated no restrictions.  He verbally agreed with the treatment plan.      2.  I would like him follow up in clinic on a PRN basis.  He was instructed to contact the clinic for any problems or concerns.

## (undated) DEVICE — DRESSING XEROFORM NONADH 1X8IN

## (undated) DEVICE — GLOVE SENSICARE PI ALOE 8

## (undated) DEVICE — APPLICATOR CHLORAPREP ORN 26ML

## (undated) DEVICE — BOWL STERILE LARGE 32OZ

## (undated) DEVICE — SYR 10CC LUER LOCK

## (undated) DEVICE — SPONGE COTTON TRAY 4X4IN

## (undated) DEVICE — CORD BIPOLAR 12 FOOT

## (undated) DEVICE — DRAPE HALF SURGICAL 40X58IN

## (undated) DEVICE — HANDLE SURG LIGHT NONRIGID

## (undated) DEVICE — GLOVE PROTEXIS LTX MICRO 8

## (undated) DEVICE — BANDAGE ESMARK LATEX FREE 4INX

## (undated) DEVICE — GLOVE PROTEXIS LTX MICRO  7.5

## (undated) DEVICE — NDL 27G X 1 1/4

## (undated) DEVICE — STRAP OR TABLE 5IN X 72IN

## (undated) DEVICE — SUT ETHILON 4-0 PS2 18 BLK

## (undated) DEVICE — DRAPE THREE-QTR REINF 53X77IN

## (undated) DEVICE — BAND RUBBER STERILE 1/4X3.5IN

## (undated) DEVICE — PAD CAST SPECIALIST STRL 3

## (undated) DEVICE — TOURNIQUET SB QC DP 18X4IN

## (undated) DEVICE — DRAPE STERI-DRAPE 1000 17X11IN

## (undated) DEVICE — KIT SAHARA DRAPE DRAW/LIFT

## (undated) DEVICE — Device

## (undated) DEVICE — BANDAGE ELAS SOFTWRAP ST 3X5YD

## (undated) DEVICE — DRAPE U SPLIT SHEET 54X76IN

## (undated) DEVICE — BLADE SURG #15 CARBON STEEL

## (undated) DEVICE — ALCOHOL 70% ISOP RUBBING 4OZ

## (undated) DEVICE — FORCEP STRAIGHT DISP

## (undated) DEVICE — APPLICATOR CHLORAPREP CLR 10.5

## (undated) DEVICE — DRAPE HAND STERILE